# Patient Record
Sex: FEMALE | Race: WHITE
[De-identification: names, ages, dates, MRNs, and addresses within clinical notes are randomized per-mention and may not be internally consistent; named-entity substitution may affect disease eponyms.]

---

## 2019-04-02 ENCOUNTER — HOSPITAL ENCOUNTER (EMERGENCY)
Dept: HOSPITAL 94 - ER | Age: 54
Discharge: HOME | End: 2019-04-02
Payer: MEDICARE

## 2019-04-02 VITALS — WEIGHT: 190.39 LBS | BODY MASS INDEX: 38.38 KG/M2 | HEIGHT: 59 IN

## 2019-04-02 VITALS — DIASTOLIC BLOOD PRESSURE: 84 MMHG | SYSTOLIC BLOOD PRESSURE: 155 MMHG

## 2019-04-02 DIAGNOSIS — M25.562: ICD-10-CM

## 2019-04-02 DIAGNOSIS — Z88.8: ICD-10-CM

## 2019-04-02 DIAGNOSIS — M25.551: ICD-10-CM
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
## 2019-04-02 DIAGNOSIS — M25.552: ICD-10-CM

## 2019-04-02 DIAGNOSIS — I10: ICD-10-CM

## 2019-04-02 DIAGNOSIS — Z79.899: ICD-10-CM
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
## 2019-04-02 DIAGNOSIS — M25.561: Primary | ICD-10-CM

## 2019-04-02 PROCEDURE — 73502 X-RAY EXAM HIP UNI 2-3 VIEWS: CPT

## 2019-04-02 PROCEDURE — 73564 X-RAY EXAM KNEE 4 OR MORE: CPT

## 2019-04-02 PROCEDURE — 82948 REAGENT STRIP/BLOOD GLUCOSE: CPT

## 2019-04-02 PROCEDURE — 99284 EMERGENCY DEPT VISIT MOD MDM: CPT

## 2019-04-02 NOTE — NUR
DISCUSSED PLAN OF CARE WITH DR NICHOLSON: JOY PATIENT MEETS SIRS CRITERIA: 
PATIENT BEING TREATED FOR UTI: SEEN AT [facility] A FEW DAYS AGO

## 2019-04-02 NOTE — NUR
PHONED MOTHER AND LET HER KNOW THAT HER DAUGHTER IS HERE

THE MOTHER ASKED WHY SHE IS NOT AT [facility]; I TOLD HER THAT I DONT KNOW WHY EMS 
BROUGHT HER HERE

## 2019-06-03 ENCOUNTER — HOSPITAL ENCOUNTER (OUTPATIENT)
Dept: HOSPITAL 94 - GI LAB | Age: 54
Discharge: HOME | End: 2019-06-03
Attending: INTERNAL MEDICINE
Payer: MEDICARE

## 2019-06-03 VITALS — SYSTOLIC BLOOD PRESSURE: 134 MMHG | DIASTOLIC BLOOD PRESSURE: 75 MMHG

## 2019-06-03 VITALS — WEIGHT: 190.39 LBS | BODY MASS INDEX: 44.06 KG/M2 | HEIGHT: 55 IN

## 2019-06-03 VITALS — SYSTOLIC BLOOD PRESSURE: 138 MMHG | DIASTOLIC BLOOD PRESSURE: 75 MMHG

## 2019-06-03 VITALS — SYSTOLIC BLOOD PRESSURE: 140 MMHG | DIASTOLIC BLOOD PRESSURE: 73 MMHG

## 2019-06-03 VITALS — SYSTOLIC BLOOD PRESSURE: 132 MMHG | DIASTOLIC BLOOD PRESSURE: 75 MMHG

## 2019-06-03 VITALS — SYSTOLIC BLOOD PRESSURE: 136 MMHG | DIASTOLIC BLOOD PRESSURE: 87 MMHG

## 2019-06-03 DIAGNOSIS — E66.9: ICD-10-CM

## 2019-06-03 DIAGNOSIS — K63.5: Primary | ICD-10-CM

## 2019-06-03 DIAGNOSIS — K64.8: ICD-10-CM

## 2019-06-03 PROCEDURE — 44388 COLONOSCOPY THRU STOMA SPX: CPT

## 2019-06-03 PROCEDURE — 99152 MOD SED SAME PHYS/QHP 5/>YRS: CPT

## 2019-06-03 PROCEDURE — 50555 KIDNEY ENDOSCOPY & BIOPSY: CPT

## 2019-06-03 PROCEDURE — 44389 COLONOSCOPY WITH BIOPSY: CPT

## 2019-06-03 PROCEDURE — 88305 TISSUE EXAM BY PATHOLOGIST: CPT

## 2020-09-30 ENCOUNTER — HOSPITAL ENCOUNTER (OUTPATIENT)
Dept: HOSPITAL 94 - WOUND CARE | Age: 55
Discharge: HOME | End: 2020-09-30
Attending: NURSE PRACTITIONER
Payer: MEDICARE

## 2020-09-30 DIAGNOSIS — I10: ICD-10-CM

## 2020-09-30 DIAGNOSIS — Z79.899: ICD-10-CM

## 2020-09-30 DIAGNOSIS — E66.9: ICD-10-CM
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
## 2020-09-30 DIAGNOSIS — K64.8: ICD-10-CM
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
## 2020-09-30 DIAGNOSIS — L98.492: ICD-10-CM
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
## 2020-09-30 DIAGNOSIS — S31.40XA: Primary | ICD-10-CM
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
## 2020-09-30 PROCEDURE — 97597 DBRDMT OPN WND 1ST 20 CM/<: CPT

## 2020-11-24 ENCOUNTER — HOSPITAL ENCOUNTER (OUTPATIENT)
Dept: HOSPITAL 94 - WOUND CARE | Age: 55
Discharge: HOME | End: 2020-11-24
Attending: NURSE PRACTITIONER
Payer: MEDICARE

## 2020-11-24 DIAGNOSIS — R41.3: ICD-10-CM

## 2020-11-24 DIAGNOSIS — F32.9: ICD-10-CM
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
## 2020-11-24 DIAGNOSIS — E66.9: ICD-10-CM

## 2020-11-24 DIAGNOSIS — Z79.899: ICD-10-CM

## 2020-11-24 DIAGNOSIS — S31.40XD: Primary | ICD-10-CM

## 2020-11-24 DIAGNOSIS — F41.9: ICD-10-CM

## 2020-11-24 DIAGNOSIS — K21.9: ICD-10-CM

## 2020-11-24 DIAGNOSIS — H93.19: ICD-10-CM

## 2020-11-24 DIAGNOSIS — K64.8: ICD-10-CM

## 2020-11-24 DIAGNOSIS — L98.492: ICD-10-CM

## 2020-11-24 DIAGNOSIS — I10: ICD-10-CM

## 2020-12-07 ENCOUNTER — HOSPITAL ENCOUNTER (OUTPATIENT)
Dept: HOSPITAL 94 - WOUND CARE | Age: 55
Discharge: HOME | End: 2020-12-07
Attending: NURSE PRACTITIONER
Payer: MEDICARE

## 2020-12-07 DIAGNOSIS — K21.9: ICD-10-CM

## 2020-12-07 DIAGNOSIS — L98.492: ICD-10-CM

## 2020-12-07 DIAGNOSIS — I10: ICD-10-CM
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
## 2020-12-07 DIAGNOSIS — Z79.899: ICD-10-CM

## 2020-12-07 DIAGNOSIS — S31.40XD: Primary | ICD-10-CM

## 2020-12-07 DIAGNOSIS — E66.9: ICD-10-CM

## 2020-12-07 DIAGNOSIS — K64.8: ICD-10-CM

## 2020-12-14 ENCOUNTER — HOSPITAL ENCOUNTER (OUTPATIENT)
Dept: HOSPITAL 94 - WOUND CARE | Age: 55
Discharge: HOME | End: 2020-12-14
Attending: NURSE PRACTITIONER
Payer: MEDICARE

## 2020-12-14 DIAGNOSIS — S31.40XD: Primary | ICD-10-CM
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
## 2020-12-14 DIAGNOSIS — K64.8: ICD-10-CM

## 2020-12-14 DIAGNOSIS — L98.492: ICD-10-CM

## 2020-12-14 DIAGNOSIS — E66.9: ICD-10-CM

## 2020-12-14 DIAGNOSIS — K21.9: ICD-10-CM

## 2020-12-14 DIAGNOSIS — Z79.899: ICD-10-CM

## 2020-12-14 DIAGNOSIS — I10: ICD-10-CM

## 2020-12-14 PROCEDURE — 97597 DBRDMT OPN WND 1ST 20 CM/<: CPT

## 2021-01-06 ENCOUNTER — HOSPITAL ENCOUNTER (OUTPATIENT)
Dept: HOSPITAL 94 - WOUND CARE | Age: 56
Discharge: HOME | End: 2021-01-06
Attending: NURSE PRACTITIONER
Payer: MEDICARE

## 2021-01-06 DIAGNOSIS — Z79.899: ICD-10-CM

## 2021-01-06 DIAGNOSIS — F32.9: ICD-10-CM

## 2021-01-06 DIAGNOSIS — E66.9: ICD-10-CM
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
## 2021-01-06 DIAGNOSIS — S31.40XD: Primary | ICD-10-CM

## 2021-01-06 DIAGNOSIS — I10: ICD-10-CM

## 2021-01-06 DIAGNOSIS — L98.492: ICD-10-CM

## 2021-01-06 DIAGNOSIS — K64.8: ICD-10-CM

## 2021-01-06 DIAGNOSIS — K21.9: ICD-10-CM

## 2021-01-06 DIAGNOSIS — F41.9: ICD-10-CM
